# Patient Record
Sex: MALE | Race: WHITE
[De-identification: names, ages, dates, MRNs, and addresses within clinical notes are randomized per-mention and may not be internally consistent; named-entity substitution may affect disease eponyms.]

---
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
## 2021-06-21 ENCOUNTER — HOSPITAL ENCOUNTER (EMERGENCY)
Dept: HOSPITAL 47 - EC | Age: 21
Discharge: HOME | End: 2021-06-21
Payer: COMMERCIAL

## 2021-06-21 VITALS — DIASTOLIC BLOOD PRESSURE: 81 MMHG | SYSTOLIC BLOOD PRESSURE: 135 MMHG

## 2021-06-21 VITALS — RESPIRATION RATE: 18 BRPM | TEMPERATURE: 98.1 F

## 2021-06-21 VITALS — HEART RATE: 78 BPM

## 2021-06-21 DIAGNOSIS — R10.9: ICD-10-CM

## 2021-06-21 DIAGNOSIS — R19.7: ICD-10-CM

## 2021-06-21 DIAGNOSIS — F17.200: ICD-10-CM

## 2021-06-21 DIAGNOSIS — E86.0: Primary | ICD-10-CM

## 2021-06-21 DIAGNOSIS — R11.2: ICD-10-CM

## 2021-06-21 DIAGNOSIS — Z91.011: ICD-10-CM

## 2021-06-21 LAB
ALBUMIN SERPL-MCNC: 5.2 G/DL (ref 3.5–5)
ALP SERPL-CCNC: 110 U/L (ref 38–126)
ALT SERPL-CCNC: 20 U/L (ref 4–49)
AMYLASE SERPL-CCNC: 37 U/L (ref 30–110)
ANION GAP SERPL CALC-SCNC: 16 MMOL/L
AST SERPL-CCNC: 25 U/L (ref 17–59)
BASOPHILS # BLD AUTO: 0 K/UL (ref 0–0.2)
BASOPHILS NFR BLD AUTO: 0 %
BUN SERPL-SCNC: 21 MG/DL (ref 9–20)
CALCIUM SPEC-MCNC: 10.1 MG/DL (ref 8.4–10.2)
CHLORIDE SERPL-SCNC: 104 MMOL/L (ref 98–107)
CO2 SERPL-SCNC: 20 MMOL/L (ref 22–30)
EOSINOPHIL # BLD AUTO: 0 K/UL (ref 0–0.7)
EOSINOPHIL NFR BLD AUTO: 0 %
ERYTHROCYTE [DISTWIDTH] IN BLOOD BY AUTOMATED COUNT: 5.84 M/UL (ref 4.3–5.9)
ERYTHROCYTE [DISTWIDTH] IN BLOOD: 12.5 % (ref 11.5–15.5)
GLUCOSE SERPL-MCNC: 115 MG/DL (ref 74–99)
HCT VFR BLD AUTO: 48.5 % (ref 39–53)
HGB BLD-MCNC: 17.1 GM/DL (ref 13–17.5)
KETONES UR QL STRIP.AUTO: (no result)
LIPASE SERPL-CCNC: 50 U/L (ref 23–300)
LYMPHOCYTES # SPEC AUTO: 0.6 K/UL (ref 1–4.8)
LYMPHOCYTES NFR SPEC AUTO: 5 %
MCH RBC QN AUTO: 29.3 PG (ref 25–35)
MCHC RBC AUTO-ENTMCNC: 35.3 G/DL (ref 31–37)
MCV RBC AUTO: 83 FL (ref 80–100)
MONOCYTES # BLD AUTO: 0.3 K/UL (ref 0–1)
MONOCYTES NFR BLD AUTO: 3 %
NEUTROPHILS # BLD AUTO: 9.7 K/UL (ref 1.3–7.7)
NEUTROPHILS NFR BLD AUTO: 91 %
PH UR: 7 [PH] (ref 5–8)
PLATELET # BLD AUTO: 286 K/UL (ref 150–450)
POTASSIUM SERPL-SCNC: 4 MMOL/L (ref 3.5–5.1)
PROT SERPL-MCNC: 7.9 G/DL (ref 6.3–8.2)
PROT UR QL: (no result)
RBC UR QL: <1 /HPF (ref 0–5)
SODIUM SERPL-SCNC: 140 MMOL/L (ref 137–145)
SP GR UR: 1.04 (ref 1–1.03)
UROBILINOGEN UR QL STRIP: 3 MG/DL (ref ?–2)
WBC # BLD AUTO: 10.7 K/UL (ref 4–11)
WBC # UR AUTO: 1 /HPF (ref 0–5)

## 2021-06-21 PROCEDURE — 82150 ASSAY OF AMYLASE: CPT

## 2021-06-21 PROCEDURE — 96361 HYDRATE IV INFUSION ADD-ON: CPT

## 2021-06-21 PROCEDURE — 36415 COLL VENOUS BLD VENIPUNCTURE: CPT

## 2021-06-21 PROCEDURE — 74018 RADEX ABDOMEN 1 VIEW: CPT

## 2021-06-21 PROCEDURE — 99284 EMERGENCY DEPT VISIT MOD MDM: CPT

## 2021-06-21 PROCEDURE — 96374 THER/PROPH/DIAG INJ IV PUSH: CPT

## 2021-06-21 PROCEDURE — 96376 TX/PRO/DX INJ SAME DRUG ADON: CPT

## 2021-06-21 PROCEDURE — 85025 COMPLETE CBC W/AUTO DIFF WBC: CPT

## 2021-06-21 PROCEDURE — 80053 COMPREHEN METABOLIC PANEL: CPT

## 2021-06-21 PROCEDURE — 81001 URINALYSIS AUTO W/SCOPE: CPT

## 2021-06-21 PROCEDURE — 96375 TX/PRO/DX INJ NEW DRUG ADDON: CPT

## 2021-06-21 PROCEDURE — 83690 ASSAY OF LIPASE: CPT

## 2021-06-21 NOTE — ED
Nausea/Vomiting/Diarrhea HPI





- General


Chief complaint: Nausea/Vomiting/Diarrhea


Stated complaint: Vomiting,Dizziness


Source: patient, RN notes reviewed


Mode of arrival: ambulatory


Limitations: no limitations





- History of Present Illness


Initial comments: 





20-year-old white male presents to the emergency room with 3 days of nausea 

vomiting and diarrhea.  Patient states that on Friday he had 6+ episodes of 

vomiting with 2 loose stools, Sunday he had only one episode of vomiting, today 

he had multiple episodes of vomiting again all bilious in color.  She patient 

denies any fevers.  Patient denies daily alcohol on a daily basis or any drug 

use.  Patient states he used to smoke Marijuana daily but he certainly quit.  

Patient is afebrile at 98.1 no medical history.  No surgical history.  Patient 

did admit that he had edibles and drink alcohol this weekend and then became si

ck.  Patient did try eating applesauce this morning and became sick.  


MD complaint: nausea, vomiting, diarrhea


-: days(s) (3)


Description of Vomiting: bilious


Description of Diarrhea: water


Associated Abdominal Pain: Yes


Location: diffuse


Radiation: none


Severity scale (1-10): 6


Quality: constant


Consistency: constant


Improves with: none


Worsens with: eating, vomiting


Associated Symptoms: nausea/vomiting





- Related Data


                                  Previous Rx's











 Medication  Instructions  Recorded


 


Acetaminophen Tab [Tylenol] 650 mg PO Q6H #30 tab 10/09/20


 


Docusate [Colace] 100 mg PO BID #20 capsule 10/09/20


 


Ibuprofen [Motrin] 600 mg PO Q6HR PRN #40 tab 10/09/20


 


oxyCODONE HCL [OxyIR] 5 mg PO Q4H PRN 3 Days #18 tab 10/09/20


 


Ondansetron Odt [Zofran Odt] 4 mg PO Q8HR PRN #10 tab 06/21/21











                                    Allergies











Allergy/AdvReac Type Severity Reaction Status Date / Time


 


lactose AdvReac  Nausea Verified 06/21/21 14:51














Review of Systems


ROS Statement: 


Those systems with pertinent positive or pertinent negative responses have been 

documented in the HPI.





ROS Other: All systems not noted in ROS Statement are negative.





Past Medical History


Past Medical History: No Reported History


Additional Past Medical History / Comment(s): pilonidal cyst


History of Any Multi-Drug Resistant Organisms: None Reported


Past Surgical History: No Surgical Hx Reported


Additional Past Surgical History / Comment(s): EGD


Past Anesthesia/Blood Transfusion Reactions: No Reported Reaction


Past Psychological History: Anxiety


Smoking Status: Vaper


Past Alcohol Use History: Occasional


Past Drug Use History: Marijuana





- Past Family History


  ** Mother


Family Medical History: No Reported History





  ** Father


Family Medical History: No Reported History





  ** Brother(s)


Family Medical History: Asthma





General Exam


Limitations: no limitations


General appearance: alert, in no apparent distress


Head exam: Present: atraumatic, normocephalic, normal inspection


Eye exam: Present: normal appearance, PERRL, EOMI.  Absent: scleral icterus, 

conjunctival injection, periorbital swelling


ENT exam: Present: normal exam, normal oropharynx, mucous membranes moist


Neck exam: Present: normal inspection, full ROM.  Absent: tenderness, 

meningismus, lymphadenopathy, thyromegaly


Respiratory exam: Present: normal lung sounds bilaterally.  Absent: respiratory 

distress, wheezes, rales, rhonchi, stridor, chest wall tenderness, accessory 

muscle use, decreased breath sounds


Cardiovascular Exam: Present: regular rate, normal rhythm, normal heart sounds. 

Absent: systolic murmur, diastolic murmur, rubs, gallop, clicks


GI/Abdominal exam: Present: soft, normal bowel sounds.  Absent: distended, 

tenderness, guarding, rebound, rigid, organomegaly, mass, hernia


Extremities exam: Present: normal inspection, full ROM, normal capillary refill.

 Absent: tenderness, pedal edema, joint swelling, calf tenderness


Back exam: Present: normal inspection, full ROM.  Absent: tenderness, CVA 

tenderness (R), CVA tenderness (L), muscle spasm, paraspinal tenderness, 

vertebral tenderness, rash noted


Neurological exam: Present: alert, oriented X3, CN II-XII intact


Psychiatric exam: Present: normal affect, normal mood


Skin exam: Present: warm, dry, intact, normal color.  Absent: rash, cyanosis, 

diaphoretic, erythema, petechiae, pallor, mottled





Course


                                   Vital Signs











  06/21/21 06/21/21





  14:48 18:46


 


Temperature 98.1 F 


 


Pulse Rate 60 62


 


Respiratory 18 18





Rate  


 


Blood Pressure 147/87 135/81


 


O2 Sat by Pulse 98 100





Oximetry  














- Reevaluation(s)


Reevaluation #1: 





06/21/21 18:23


Patient states he does feel a little bit better after the medic and IV fluids.  

Patient has not had any further vomiting.  He does admit to smoking marijuana 

daily but recently stopping edible this weekend while drinking.


Time: 18:23





Medical Decision Making





- Medical Decision Making





KUB x-ray shows no obstruction no pneumoperitoneum and no pathological 

calcifications over the kidneys.  She denies any hematochezia or hematemesis.  

Abdomen is soft. Patient's vomiting has subsided after a dose of IV Zofran and 1

L bolus.  Hemoglobin and hematocrit are within normal limits.  UA shows 4+ 

ketones patient was given 2 L of IV fluids.  Patient able to discharge 

requesting a couple of doses of Zofran to be taken at home as needed.  Case 

discussed with Dr. Bravo who was agreeable to this plan of care





- Lab Data


Result diagrams: 


                                 06/21/21 16:50





                                 06/21/21 16:50


                                   Lab Results











  06/21/21 06/21/21 06/21/21 Range/Units





  16:50 16:50 17:49 


 


WBC  10.7    (4.0-11.0)  k/uL


 


RBC  5.84    (4.30-5.90)  m/uL


 


Hgb  17.1    (13.0-17.5)  gm/dL


 


Hct  48.5    (39.0-53.0)  %


 


MCV  83.0    (80.0-100.0)  fL


 


MCH  29.3    (25.0-35.0)  pg


 


MCHC  35.3    (31.0-37.0)  g/dL


 


RDW  12.5    (11.5-15.5)  %


 


Plt Count  286    (150-450)  k/uL


 


MPV  8.0    


 


Neutrophils %  91    %


 


Lymphocytes %  5    %


 


Monocytes %  3    %


 


Eosinophils %  0    %


 


Basophils %  0    %


 


Neutrophils #  9.7 H    (1.3-7.7)  k/uL


 


Lymphocytes #  0.6 L    (1.0-4.8)  k/uL


 


Monocytes #  0.3    (0-1.0)  k/uL


 


Eosinophils #  0.0    (0-0.7)  k/uL


 


Basophils #  0.0    (0-0.2)  k/uL


 


Sodium   140   (137-145)  mmol/L


 


Potassium   4.0   (3.5-5.1)  mmol/L


 


Chloride   104   ()  mmol/L


 


Carbon Dioxide   20 L   (22-30)  mmol/L


 


Anion Gap   16   mmol/L


 


BUN   21 H   (9-20)  mg/dL


 


Creatinine   0.99   (0.66-1.25)  mg/dL


 


Est GFR (CKD-EPI)AfAm   >90   (>60 ml/min/1.73 sqM)  


 


Est GFR (CKD-EPI)NonAf   >90   (>60 ml/min/1.73 sqM)  


 


Glucose   115 H   (74-99)  mg/dL


 


Calcium   10.1   (8.4-10.2)  mg/dL


 


Total Bilirubin   1.3   (0.2-1.3)  mg/dL


 


AST   25   (17-59)  U/L


 


ALT   20   (4-49)  U/L


 


Alkaline Phosphatase   110   ()  U/L


 


Total Protein   7.9   (6.3-8.2)  g/dL


 


Albumin   5.2 H   (3.5-5.0)  g/dL


 


Amylase   37   ()  U/L


 


Lipase   50   ()  U/L


 


Urine Color    Yellow  


 


Urine Appearance    Clear  (Clear)  


 


Urine pH    7.0  (5.0-8.0)  


 


Ur Specific Gravity    1.039 H  (1.001-1.035)  


 


Urine Protein    1+ H  (Negative)  


 


Urine Glucose (UA)    Negative  (Negative)  


 


Urine Ketones    4+ H  (Negative)  


 


Urine Blood    Negative  (Negative)  


 


Urine Nitrite    Negative  (Negative)  


 


Urine Bilirubin    Negative  (Negative)  


 


Urine Urobilinogen    3.0  (<2.0)  mg/dL


 


Ur Leukocyte Esterase    Negative  (Negative)  


 


Urine RBC    <1  (0-5)  /hpf


 


Urine WBC    1  (0-5)  /hpf


 


Urine Mucus    Rare H  (None)  /hpf














Disposition


Clinical Impression: 


 Dehydration, Nausea vomiting and diarrhea





Disposition: HOME SELF-CARE


Condition: Good


Instructions (If sedation given, give patient instructions):  Dehydration (ED), 

Acute Nausea and Vomiting (ED), Acute Diarrhea (ED)


Additional Instructions: 


Increase her fluid intake, follow up with the primary care doctor in 1 week.  

Return if worsening symptoms, fever, inability keep fluids down.


Prescriptions: 


Ondansetron Odt [Zofran Odt] 4 mg PO Q8HR PRN #10 tab


 PRN Reason: Nausea


Is patient prescribed a controlled substance at d/c from ED?: No


Referrals: 


Berta Tipton MD [Primary Care Provider] - 1-2 days


Time of Disposition: 18:28

## 2021-06-21 NOTE — XR
EXAMINATION TYPE: XR KUB

 

DATE OF EXAM: 6/21/2021

 

COMPARISON: 4/30/2020

 

HISTORY: Abdominal pain

 

TECHNIQUE: 2 views upright

 

FINDINGS: There is no sign of intestinal obstruction or pneumoperitoneum. Fecal pattern is normal. Th
ere is no evidence of a mass. There are no pathologic calcifications over the kidneys. Lung bases are
 clear.

 

IMPRESSION: Nonacute abdomen. No change.

## 2021-06-23 ENCOUNTER — HOSPITAL ENCOUNTER (EMERGENCY)
Dept: HOSPITAL 47 - EC | Age: 21
Discharge: HOME | End: 2021-06-23
Payer: COMMERCIAL

## 2021-06-23 VITALS — TEMPERATURE: 97.9 F

## 2021-06-23 VITALS — RESPIRATION RATE: 18 BRPM | HEART RATE: 77 BPM | SYSTOLIC BLOOD PRESSURE: 156 MMHG | DIASTOLIC BLOOD PRESSURE: 77 MMHG

## 2021-06-23 DIAGNOSIS — K52.9: Primary | ICD-10-CM

## 2021-06-23 DIAGNOSIS — Z91.011: ICD-10-CM

## 2021-06-23 DIAGNOSIS — E86.0: ICD-10-CM

## 2021-06-23 DIAGNOSIS — F17.290: ICD-10-CM

## 2021-06-23 DIAGNOSIS — R12: ICD-10-CM

## 2021-06-23 LAB
ALBUMIN SERPL-MCNC: 5.6 G/DL (ref 3.5–5)
ALP SERPL-CCNC: 112 U/L (ref 38–126)
ALT SERPL-CCNC: 19 U/L (ref 4–49)
ANION GAP SERPL CALC-SCNC: 18 MMOL/L
AST SERPL-CCNC: 24 U/L (ref 17–59)
BASOPHILS # BLD AUTO: 0 K/UL (ref 0–0.2)
BASOPHILS NFR BLD AUTO: 0 %
BILIRUB UR QL STRIP.AUTO: (no result)
BUN SERPL-SCNC: 20 MG/DL (ref 9–20)
CALCIUM SPEC-MCNC: 10.5 MG/DL (ref 8.4–10.2)
CHLORIDE SERPL-SCNC: 101 MMOL/L (ref 98–107)
CO2 SERPL-SCNC: 20 MMOL/L (ref 22–30)
EOSINOPHIL # BLD AUTO: 0.1 K/UL (ref 0–0.7)
EOSINOPHIL NFR BLD AUTO: 1 %
ERYTHROCYTE [DISTWIDTH] IN BLOOD BY AUTOMATED COUNT: 5.86 M/UL (ref 4.3–5.9)
ERYTHROCYTE [DISTWIDTH] IN BLOOD: 12.4 % (ref 11.5–15.5)
GLUCOSE SERPL-MCNC: 84 MG/DL (ref 74–99)
HCT VFR BLD AUTO: 48.4 % (ref 39–53)
HGB BLD-MCNC: 17.1 GM/DL (ref 13–17.5)
KETONES UR QL STRIP.AUTO: (no result)
LIPASE SERPL-CCNC: 50 U/L (ref 23–300)
LYMPHOCYTES # SPEC AUTO: 1.5 K/UL (ref 1–4.8)
LYMPHOCYTES NFR SPEC AUTO: 14 %
MCH RBC QN AUTO: 29.1 PG (ref 25–35)
MCHC RBC AUTO-ENTMCNC: 35.3 G/DL (ref 31–37)
MCV RBC AUTO: 82.6 FL (ref 80–100)
MONOCYTES # BLD AUTO: 0.6 K/UL (ref 0–1)
MONOCYTES NFR BLD AUTO: 6 %
NEUTROPHILS # BLD AUTO: 8.3 K/UL (ref 1.3–7.7)
NEUTROPHILS NFR BLD AUTO: 78 %
PH UR: 6 [PH] (ref 5–8)
PLATELET # BLD AUTO: 293 K/UL (ref 150–450)
POTASSIUM SERPL-SCNC: 3.7 MMOL/L (ref 3.5–5.1)
PROT SERPL-MCNC: 8.5 G/DL (ref 6.3–8.2)
PROT UR QL: (no result)
RBC UR QL: <1 /HPF (ref 0–5)
SODIUM SERPL-SCNC: 139 MMOL/L (ref 137–145)
SP GR UR: 1.04 (ref 1–1.03)
SQUAMOUS UR QL AUTO: <1 /HPF (ref 0–4)
UROBILINOGEN UR QL STRIP: 2 MG/DL (ref ?–2)
WBC # BLD AUTO: 10.7 K/UL (ref 4–11)
WBC # UR AUTO: 1 /HPF (ref 0–5)

## 2021-06-23 PROCEDURE — 85025 COMPLETE CBC W/AUTO DIFF WBC: CPT

## 2021-06-23 PROCEDURE — 36415 COLL VENOUS BLD VENIPUNCTURE: CPT

## 2021-06-23 PROCEDURE — 96375 TX/PRO/DX INJ NEW DRUG ADDON: CPT

## 2021-06-23 PROCEDURE — 96374 THER/PROPH/DIAG INJ IV PUSH: CPT

## 2021-06-23 PROCEDURE — 96361 HYDRATE IV INFUSION ADD-ON: CPT

## 2021-06-23 PROCEDURE — 81001 URINALYSIS AUTO W/SCOPE: CPT

## 2021-06-23 PROCEDURE — 99284 EMERGENCY DEPT VISIT MOD MDM: CPT

## 2021-06-23 PROCEDURE — 74177 CT ABD & PELVIS W/CONTRAST: CPT

## 2021-06-23 PROCEDURE — 80053 COMPREHEN METABOLIC PANEL: CPT

## 2021-06-23 PROCEDURE — 83690 ASSAY OF LIPASE: CPT

## 2021-06-23 PROCEDURE — 83605 ASSAY OF LACTIC ACID: CPT

## 2021-06-23 NOTE — ED
Abdominal Pain HPI





- General


Chief Complaint: Abdominal Pain


Stated Complaint: revisit - vomiting


Time Seen by Provider: 06/23/21 12:08


Source: patient, RN notes reviewed


Mode of arrival: ambulatory


Limitations: no limitations





- History of Present Illness


Initial Comments: 





This a 20-year-old male sent emergency from chief complaint nausea vomiting.  

Patient states been sick since Saturday.  He states that he was seen here a few 

days ago was given some medication but states that has not helped.  he states 

this was Zofran at home.  He did have a few episodes of diarrhea lines of mild 

heartburn.  Patient denies any prior abdominal surgeries.  He states he has some

issues like this in the past in which she did have EGD done.  He states he was 

told it was just because he is marijuana at that time.  Patient denies any 

dysuria hematuria no sick contacts.





- Related Data


                                  Previous Rx's











 Medication  Instructions  Recorded


 


Ondansetron Odt [Zofran Odt] 4 mg PO Q8HR PRN #10 tab 06/21/21


 


Metoclopramide [Reglan] 10 mg PO TID PRN #15 tab 06/23/21


 


Omeprazole [PriLOSEC] 40 mg PO DAILY #14 cap 06/23/21











                                    Allergies











Allergy/AdvReac Type Severity Reaction Status Date / Time


 


lactose AdvReac  Nausea Verified 06/23/21 13:14














Review of Systems


ROS Statement: 


Those systems with pertinent positive or pertinent negative responses have been 

documented in the HPI.





ROS Other: All systems not noted in ROS Statement are negative.





Past Medical History


Past Medical History: No Reported History


Additional Past Medical History / Comment(s): pilonidal cyst


History of Any Multi-Drug Resistant Organisms: None Reported


Past Surgical History: No Surgical Hx Reported


Additional Past Surgical History / Comment(s): EGD


Past Anesthesia/Blood Transfusion Reactions: No Reported Reaction


Past Psychological History: Anxiety


Smoking Status: Vaper


Past Alcohol Use History: Occasional


Past Drug Use History: Marijuana





- Past Family History


  ** Mother


Family Medical History: No Reported History





  ** Father


Family Medical History: No Reported History





  ** Brother(s)


Family Medical History: Asthma





General Exam


Limitations: no limitations


General appearance: alert, in no apparent distress


Head exam: Present: atraumatic, normocephalic, normal inspection


Eye exam: Present: normal appearance, PERRL, EOMI.  Absent: scleral icterus, 

conjunctival injection, periorbital swelling


ENT exam: Present: normal exam, normal oropharynx, mucous membranes moist


Neck exam: Present: normal inspection, full ROM.  Absent: tenderness, 

meningismus, lymphadenopathy


Respiratory exam: Present: normal lung sounds bilaterally.  Absent: respiratory 

distress, wheezes, rales, rhonchi, stridor


Cardiovascular Exam: Present: regular rate, normal rhythm, normal heart sounds. 

 Absent: systolic murmur, diastolic murmur, rubs, gallop, clicks


GI/Abdominal exam: Present: soft, tenderness, normal bowel sounds.  Absent: 

distended, guarding, rebound, rigid


Neurological exam: Present: alert


Skin exam: Present: warm, dry, intact, normal color.  Absent: rash





Course


                                   Vital Signs











  06/23/21





  11:17


 


Temperature 97.9 F


 


Pulse Rate 69


 


Respiratory 20





Rate 


 


Blood Pressure 150/85


 


O2 Sat by Pulse 100





Oximetry 














Medical Decision Making





- Medical Decision Making


And she'll presented for nausea vomiting patient had prior urinary visit which 

are reviewed showed sinus dehydration.  Patient was well-hydrated today, given 

antiemetics symptoms immediately resolved.  Patient CT shows evidence of 

enteritis.  Patient will be discharged in stable condition with follow-up with 

GI.








- Lab Data


Result diagrams: 


                                 06/23/21 12:49





                                 06/23/21 12:49


                                   Lab Results











  06/23/21 06/23/21 06/23/21 Range/Units





  12:49 12:49 12:49 


 


WBC  10.7    (4.0-11.0)  k/uL


 


RBC  5.86    (4.30-5.90)  m/uL


 


Hgb  17.1    (13.0-17.5)  gm/dL


 


Hct  48.4    (39.0-53.0)  %


 


MCV  82.6    (80.0-100.0)  fL


 


MCH  29.1    (25.0-35.0)  pg


 


MCHC  35.3    (31.0-37.0)  g/dL


 


RDW  12.4    (11.5-15.5)  %


 


Plt Count  293    (150-450)  k/uL


 


MPV  7.9    


 


Neutrophils %  78    %


 


Lymphocytes %  14    %


 


Monocytes %  6    %


 


Eosinophils %  1    %


 


Basophils %  0    %


 


Neutrophils #  8.3 H    (1.3-7.7)  k/uL


 


Lymphocytes #  1.5    (1.0-4.8)  k/uL


 


Monocytes #  0.6    (0-1.0)  k/uL


 


Eosinophils #  0.1    (0-0.7)  k/uL


 


Basophils #  0.0    (0-0.2)  k/uL


 


Sodium   139   (137-145)  mmol/L


 


Potassium   3.7   (3.5-5.1)  mmol/L


 


Chloride   101   ()  mmol/L


 


Carbon Dioxide   20 L   (22-30)  mmol/L


 


Anion Gap   18   mmol/L


 


BUN   20   (9-20)  mg/dL


 


Creatinine   1.05   (0.66-1.25)  mg/dL


 


Est GFR (CKD-EPI)AfAm   >90   (>60 ml/min/1.73 sqM)  


 


Est GFR (CKD-EPI)NonAf   >90   (>60 ml/min/1.73 sqM)  


 


Glucose   84   (74-99)  mg/dL


 


Plasma Lactic Acid Bladimir    1.3  (0.7-2.0)  mmol/L


 


Calcium   10.5 H   (8.4-10.2)  mg/dL


 


Total Bilirubin   1.4 H   (0.2-1.3)  mg/dL


 


AST   24   (17-59)  U/L


 


ALT   19   (4-49)  U/L


 


Alkaline Phosphatase   112   ()  U/L


 


Total Protein   8.5 H   (6.3-8.2)  g/dL


 


Albumin   5.6 H   (3.5-5.0)  g/dL


 


Lipase   50   ()  U/L














Disposition


Clinical Impression: 


 Enteritis, Dehydration





Disposition: HOME SELF-CARE


Condition: Stable


Instructions (If sedation given, give patient instructions):  Enteritis (ED)


Additional Instructions: 


Please return to the Emergency Department if symptoms worsen or any other 

concerns.


Prescriptions: 


Omeprazole [PriLOSEC] 40 mg PO DAILY #14 cap


Metoclopramide [Reglan] 10 mg PO TID PRN #15 tab


 PRN Reason: Nausea


Is patient prescribed a controlled substance at d/c from ED?: No


Referrals: 


Berta Tipton MD [Primary Care Provider] - 1-2 days


Time of Disposition: 13:57

## 2021-06-23 NOTE — CT
EXAMINATION TYPE: CT abdomen pelvis w con

 

DATE OF EXAM: 6/23/2021

 

COMPARISON: [facility] 6/21/2021

 

HISTORY: Abd pain

 

CT DLP: 1035.7 mGycm

Automated exposure control for dose reduction was used.

 

TECHNIQUE:  Helical acquisition of images from the lung bases through the pelvis have been completed.


 

CONTRAST: 

Performed without Oral Contrast and with IV Contrast, patient injected with 100 mL of Isovue 300.

 

FINDINGS: 

 

LUNG BASES: No significant abnormality is appreciated.

 

AORTA:  No significant abnormality is appreciated.

 

LIVER/GB: The liver shows low attenuation consistent with hepatic steatosis, gallbladder is unremarka
ble.

 

PANCREAS: No significant abnormality is seen.

 

SPLEEN: Enlarged at 13.6 cm in AP dimension

 

ADRENALS: No significant abnormality is seen.

 

KIDNEYS: No significant abnormality is seen.

 

 

REPRODUCTIVE ORGANS: No significant abnormality is seen

 

BOWEL:  There are fluid-filled loops of small bowel, no evident bowel obstruction. Appendix is normal
. Some proximal small bowel loops show thickened wall

 

FREE AIR:  No Free Air visible.

 

ASCITES:  None visible.

 

PELVIC ADENOPATHY:  None visualized.

 

RETROPERITONEAL ADENOPATHY:  No Retroperitoneal Adenopathy visible.

 

URINARY BLADDER:  No significant abnormality is seen.

 

OSSEOUS STRUCTURES:  There is some degenerative disc changes noted the lumbosacral junction with disc
 height loss, posterior disc bulge.

 

IMPRESSION: 

SPLENOMEGALY AND HEPATIC STEATOSIS correlate for enteritis.

## 2021-06-25 ENCOUNTER — HOSPITAL ENCOUNTER (EMERGENCY)
Dept: HOSPITAL 47 - EC | Age: 21
Discharge: HOME | End: 2021-06-25
Payer: COMMERCIAL

## 2021-06-25 VITALS — HEART RATE: 78 BPM | DIASTOLIC BLOOD PRESSURE: 90 MMHG | SYSTOLIC BLOOD PRESSURE: 147 MMHG

## 2021-06-25 VITALS — TEMPERATURE: 98.6 F | RESPIRATION RATE: 18 BRPM

## 2021-06-25 DIAGNOSIS — R19.7: ICD-10-CM

## 2021-06-25 DIAGNOSIS — R11.2: ICD-10-CM

## 2021-06-25 DIAGNOSIS — Z91.011: ICD-10-CM

## 2021-06-25 DIAGNOSIS — E86.0: Primary | ICD-10-CM

## 2021-06-25 DIAGNOSIS — Z88.8: ICD-10-CM

## 2021-06-25 DIAGNOSIS — F17.290: ICD-10-CM

## 2021-06-25 LAB
ALBUMIN SERPL-MCNC: 5.2 G/DL (ref 3.5–5)
ALP SERPL-CCNC: 108 U/L (ref 38–126)
ALT SERPL-CCNC: 18 U/L (ref 4–49)
AMYLASE SERPL-CCNC: 33 U/L (ref 30–110)
ANION GAP SERPL CALC-SCNC: 17 MMOL/L
AST SERPL-CCNC: 22 U/L (ref 17–59)
BASOPHILS # BLD AUTO: 0 K/UL (ref 0–0.2)
BASOPHILS NFR BLD AUTO: 0 %
BUN SERPL-SCNC: 21 MG/DL (ref 9–20)
CALCIUM SPEC-MCNC: 10.3 MG/DL (ref 8.4–10.2)
CHLORIDE SERPL-SCNC: 102 MMOL/L (ref 98–107)
CO2 SERPL-SCNC: 18 MMOL/L (ref 22–30)
EOSINOPHIL # BLD AUTO: 0 K/UL (ref 0–0.7)
EOSINOPHIL NFR BLD AUTO: 0 %
ERYTHROCYTE [DISTWIDTH] IN BLOOD BY AUTOMATED COUNT: 6.04 M/UL (ref 4.3–5.9)
ERYTHROCYTE [DISTWIDTH] IN BLOOD: 13 % (ref 11.5–15.5)
GLUCOSE SERPL-MCNC: 84 MG/DL (ref 74–99)
HCT VFR BLD AUTO: 50.1 % (ref 39–53)
HGB BLD-MCNC: 17.4 GM/DL (ref 13–17.5)
KETONES UR QL STRIP.AUTO: (no result)
LIPASE SERPL-CCNC: 74 U/L (ref 23–300)
LYMPHOCYTES # SPEC AUTO: 1.2 K/UL (ref 1–4.8)
LYMPHOCYTES NFR SPEC AUTO: 13 %
MCH RBC QN AUTO: 28.8 PG (ref 25–35)
MCHC RBC AUTO-ENTMCNC: 34.7 G/DL (ref 31–37)
MCV RBC AUTO: 83 FL (ref 80–100)
MONOCYTES # BLD AUTO: 0.5 K/UL (ref 0–1)
MONOCYTES NFR BLD AUTO: 6 %
NEUTROPHILS # BLD AUTO: 7.1 K/UL (ref 1.3–7.7)
NEUTROPHILS NFR BLD AUTO: 79 %
PH UR: 6 [PH] (ref 5–8)
PLATELET # BLD AUTO: 265 K/UL (ref 150–450)
POTASSIUM SERPL-SCNC: 3.6 MMOL/L (ref 3.5–5.1)
PROT SERPL-MCNC: 7.7 G/DL (ref 6.3–8.2)
SODIUM SERPL-SCNC: 137 MMOL/L (ref 137–145)
SP GR UR: 1.04 (ref 1–1.03)
UROBILINOGEN UR QL STRIP: 2 MG/DL (ref ?–2)
WBC # BLD AUTO: 9.1 K/UL (ref 4–11)

## 2021-06-25 PROCEDURE — 96361 HYDRATE IV INFUSION ADD-ON: CPT

## 2021-06-25 PROCEDURE — 86664 EPSTEIN-BARR NUCLEAR ANTIGEN: CPT

## 2021-06-25 PROCEDURE — 96375 TX/PRO/DX INJ NEW DRUG ADDON: CPT

## 2021-06-25 PROCEDURE — 83690 ASSAY OF LIPASE: CPT

## 2021-06-25 PROCEDURE — 81003 URINALYSIS AUTO W/O SCOPE: CPT

## 2021-06-25 PROCEDURE — 86663 EPSTEIN-BARR ANTIBODY: CPT

## 2021-06-25 PROCEDURE — 36415 COLL VENOUS BLD VENIPUNCTURE: CPT

## 2021-06-25 PROCEDURE — 86308 HETEROPHILE ANTIBODY SCREEN: CPT

## 2021-06-25 PROCEDURE — 86665 EPSTEIN-BARR CAPSID VCA: CPT

## 2021-06-25 PROCEDURE — 85025 COMPLETE CBC W/AUTO DIFF WBC: CPT

## 2021-06-25 PROCEDURE — 96374 THER/PROPH/DIAG INJ IV PUSH: CPT

## 2021-06-25 PROCEDURE — 96376 TX/PRO/DX INJ SAME DRUG ADON: CPT

## 2021-06-25 PROCEDURE — 80306 DRUG TEST PRSMV INSTRMNT: CPT

## 2021-06-25 PROCEDURE — 82150 ASSAY OF AMYLASE: CPT

## 2021-06-25 PROCEDURE — 99284 EMERGENCY DEPT VISIT MOD MDM: CPT

## 2021-06-25 PROCEDURE — 80053 COMPREHEN METABOLIC PANEL: CPT

## 2021-06-25 NOTE — ED
General Adult HPI





- General


Chief complaint: Nausea/Vomiting/Diarrhea


Stated complaint: NVD


Time Seen by Provider: 06/25/21 07:28


Source: patient, family, RN notes reviewed


Mode of arrival: ambulatory


Limitations: no limitations





- History of Present Illness


Initial comments: 


20-year-old male presents emergency Department chief complaint of nausea vomitin

g diarrhea.  Symptoms started on Saturday.  He has had 2 prior ER visits with 

signs of dehydration otherwise negative workup.  Patient's had CT which was 

negative.  Patient states that he's been taking her Zofran he states every 6 

hours he feels like he centimeters vomit.  Patient denies any localized pain.  

He did admit that he had issues with this one year ago and hematoma is related 

to marijuana use.  He states that he did use up on Saturday.  Patient denies any

fevers or chills no chest pain or shortness of breath.








- Related Data


                                Home Medications











 Medication  Instructions  Recorded  Confirmed


 


Ondansetron Odt [Zofran Odt] 4 mg PO Q8H PRN 06/25/21 06/25/21








                                  Previous Rx's











 Medication  Instructions  Recorded


 


Omeprazole [PriLOSEC] 40 mg PO DAILY #14 cap 06/23/21


 


Ondansetron Odt [Zofran Odt] 8 mg PO Q8HR #14 tab 06/25/21











                                    Allergies











Allergy/AdvReac Type Severity Reaction Status Date / Time


 


lactose AdvReac  Nausea & Verified 06/25/21 08:51





   Vomiting &  





   Diarrhea  


 


metoclopramide [From Reglan] AdvReac  Nausea & Verified 06/25/21 08:51





   Vomiting  














Review of Systems


ROS Statement: 


Those systems with pertinent positive or pertinent negative responses have been 

documented in the HPI.





ROS Other: All systems not noted in ROS Statement are negative.





Past Medical History


Past Medical History: No Reported History


Additional Past Medical History / Comment(s): pilonidal cyst


History of Any Multi-Drug Resistant Organisms: None Reported


Past Surgical History: No Surgical Hx Reported


Additional Past Surgical History / Comment(s): EGD


Past Anesthesia/Blood Transfusion Reactions: No Reported Reaction


Past Psychological History: Anxiety


Smoking Status: Vaper


Past Alcohol Use History: Occasional


Past Drug Use History: Marijuana





- Past Family History


  ** Mother


Family Medical History: No Reported History





  ** Father


Family Medical History: No Reported History





  ** Brother(s)


Family Medical History: Asthma





General Exam


Limitations: no limitations


General appearance: alert, in no apparent distress


Head exam: Present: atraumatic, normocephalic, normal inspection


Respiratory exam: Present: normal lung sounds bilaterally.  Absent: respiratory 

distress, wheezes, rales, rhonchi, stridor


Cardiovascular Exam: Present: regular rate, normal rhythm, normal heart sounds. 

Absent: systolic murmur, diastolic murmur, rubs, gallop, clicks


GI/Abdominal exam: Present: soft, tenderness (Mild diffuse), normal bowel 

sounds.  Absent: distended, guarding, rebound, rigid


Back exam: Absent: CVA tenderness (R), CVA tenderness (L)


Neurological exam: Present: alert


Skin exam: Present: warm, dry, intact, normal color.  Absent: rash





Course


                                   Vital Signs











  06/25/21 06/25/21 06/25/21





  07:24 08:27 09:00


 


Temperature 98.6 F  


 


Pulse Rate 77  81


 


Respiratory 18 18 18





Rate   


 


Blood Pressure 141/89  127/76


 


O2 Sat by Pulse 100  97





Oximetry   














Medical Decision Making





- Medical Decision Making





20-year-old male presented for nausea vomiting.  Patient's had a workup over 

last few days.  Patient does have signs of dehydration though was given 3 L of 

fluid, given antiemetics he states he feels better is requesting to be 

discharged prior to fluids being completed.  I did recommend patient to be 

completed.  We did discuss marijuana use.  Patient is still positive for THC.  

Did explain that he can use hot showers, Station cream.





- Lab Data


Result diagrams: 


                                 06/25/21 08:05





                                 06/25/21 08:05


                                   Lab Results











  06/25/21 06/25/21 06/25/21 Range/Units





  08:05 08:05 09:09 


 


WBC  9.1    (4.0-11.0)  k/uL


 


RBC  6.04 H    (4.30-5.90)  m/uL


 


Hgb  17.4    (13.0-17.5)  gm/dL


 


Hct  50.1    (39.0-53.0)  %


 


MCV  83.0    (80.0-100.0)  fL


 


MCH  28.8    (25.0-35.0)  pg


 


MCHC  34.7    (31.0-37.0)  g/dL


 


RDW  13.0    (11.5-15.5)  %


 


Plt Count  265    (150-450)  k/uL


 


MPV  8.0    


 


Neutrophils %  79    %


 


Lymphocytes %  13    %


 


Monocytes %  6    %


 


Eosinophils %  0    %


 


Basophils %  0    %


 


Neutrophils #  7.1    (1.3-7.7)  k/uL


 


Lymphocytes #  1.2    (1.0-4.8)  k/uL


 


Monocytes #  0.5    (0-1.0)  k/uL


 


Eosinophils #  0.0    (0-0.7)  k/uL


 


Basophils #  0.0    (0-0.2)  k/uL


 


Sodium   137   (137-145)  mmol/L


 


Potassium   3.6   (3.5-5.1)  mmol/L


 


Chloride   102   ()  mmol/L


 


Carbon Dioxide   18 L   (22-30)  mmol/L


 


Anion Gap   17   mmol/L


 


BUN   21 H   (9-20)  mg/dL


 


Creatinine   1.05   (0.66-1.25)  mg/dL


 


Est GFR (CKD-EPI)AfAm   >90   (>60 ml/min/1.73 sqM)  


 


Est GFR (CKD-EPI)NonAf   >90   (>60 ml/min/1.73 sqM)  


 


Glucose   84   (74-99)  mg/dL


 


Calcium   10.3 H   (8.4-10.2)  mg/dL


 


Total Bilirubin   1.5 H   (0.2-1.3)  mg/dL


 


AST   22   (17-59)  U/L


 


ALT   18   (4-49)  U/L


 


Alkaline Phosphatase   108   ()  U/L


 


Total Protein   7.7   (6.3-8.2)  g/dL


 


Albumin   5.2 H   (3.5-5.0)  g/dL


 


Amylase   33   ()  U/L


 


Lipase   74   ()  U/L


 


Urine Color    Yellow  


 


Urine Appearance    Clear  (Clear)  


 


Urine pH    6.0  (5.0-8.0)  


 


Ur Specific Gravity    1.036 H  (1.001-1.035)  


 


Urine Protein    Trace H  (Negative)  


 


Urine Glucose (UA)    Negative  (Negative)  


 


Urine Ketones    4+ H  (Negative)  


 


Urine Blood    Negative  (Negative)  


 


Urine Nitrite    Negative  (Negative)  


 


Urine Bilirubin    Negative  (Negative)  


 


Urine Urobilinogen    2.0  (<2.0)  mg/dL


 


Ur Leukocyte Esterase    Negative  (Negative)  


 


Urine Opiates Screen     (NotDetected)  


 


Ur Oxycodone Screen     (NotDetected)  


 


Urine Methadone Screen     (NotDetected)  


 


Ur Propoxyphene Screen     (NotDetected)  


 


Ur Barbiturates Screen     (NotDetected)  


 


U Tricyclic Antidepress     (NotDetected)  


 


Ur Phencyclidine Scrn     (NotDetected)  


 


Ur Amphetamines Screen     (NotDetected)  


 


U Methamphetamines Scrn     (NotDetected)  


 


U Benzodiazepines Scrn     (NotDetected)  


 


Urine Cocaine Screen     (NotDetected)  


 


U Marijuana (THC) Screen     (NotDetected)  














  06/25/21 Range/Units





  09:09 


 


WBC   (4.0-11.0)  k/uL


 


RBC   (4.30-5.90)  m/uL


 


Hgb   (13.0-17.5)  gm/dL


 


Hct   (39.0-53.0)  %


 


MCV   (80.0-100.0)  fL


 


MCH   (25.0-35.0)  pg


 


MCHC   (31.0-37.0)  g/dL


 


RDW   (11.5-15.5)  %


 


Plt Count   (150-450)  k/uL


 


MPV   


 


Neutrophils %   %


 


Lymphocytes %   %


 


Monocytes %   %


 


Eosinophils %   %


 


Basophils %   %


 


Neutrophils #   (1.3-7.7)  k/uL


 


Lymphocytes #   (1.0-4.8)  k/uL


 


Monocytes #   (0-1.0)  k/uL


 


Eosinophils #   (0-0.7)  k/uL


 


Basophils #   (0-0.2)  k/uL


 


Sodium   (137-145)  mmol/L


 


Potassium   (3.5-5.1)  mmol/L


 


Chloride   ()  mmol/L


 


Carbon Dioxide   (22-30)  mmol/L


 


Anion Gap   mmol/L


 


BUN   (9-20)  mg/dL


 


Creatinine   (0.66-1.25)  mg/dL


 


Est GFR (CKD-EPI)AfAm   (>60 ml/min/1.73 sqM)  


 


Est GFR (CKD-EPI)NonAf   (>60 ml/min/1.73 sqM)  


 


Glucose   (74-99)  mg/dL


 


Calcium   (8.4-10.2)  mg/dL


 


Total Bilirubin   (0.2-1.3)  mg/dL


 


AST   (17-59)  U/L


 


ALT   (4-49)  U/L


 


Alkaline Phosphatase   ()  U/L


 


Total Protein   (6.3-8.2)  g/dL


 


Albumin   (3.5-5.0)  g/dL


 


Amylase   ()  U/L


 


Lipase   ()  U/L


 


Urine Color   


 


Urine Appearance   (Clear)  


 


Urine pH   (5.0-8.0)  


 


Ur Specific Gravity   (1.001-1.035)  


 


Urine Protein   (Negative)  


 


Urine Glucose (UA)   (Negative)  


 


Urine Ketones   (Negative)  


 


Urine Blood   (Negative)  


 


Urine Nitrite   (Negative)  


 


Urine Bilirubin   (Negative)  


 


Urine Urobilinogen   (<2.0)  mg/dL


 


Ur Leukocyte Esterase   (Negative)  


 


Urine Opiates Screen  Not Detected  (NotDetected)  


 


Ur Oxycodone Screen  Not Detected  (NotDetected)  


 


Urine Methadone Screen  Not Detected  (NotDetected)  


 


Ur Propoxyphene Screen  Not Detected  (NotDetected)  


 


Ur Barbiturates Screen  Not Detected  (NotDetected)  


 


U Tricyclic Antidepress  Not Detected  (NotDetected)  


 


Ur Phencyclidine Scrn  Not Detected  (NotDetected)  


 


Ur Amphetamines Screen  Not Detected  (NotDetected)  


 


U Methamphetamines Scrn  Not Detected  (NotDetected)  


 


U Benzodiazepines Scrn  Not Detected  (NotDetected)  


 


Urine Cocaine Screen  Not Detected  (NotDetected)  


 


U Marijuana (THC) Screen  Detected H  (NotDetected)  














Disposition


Clinical Impression: 


 Dehydration, Nausea vomiting and diarrhea





Disposition: HOME SELF-CARE


Condition: Stable


Instructions (If sedation given, give patient instructions):  Acute Nausea and 

Vomiting (ED)


Additional Instructions: 


Please discontinue marijuana use.  Patient may use capsacian cream, hot 

showers.Please return to the Emergency Department if symptoms worsen or any 

other concerns.


Prescriptions: 


Ondansetron Odt [Zofran Odt] 8 mg PO Q8HR #14 tab


Is patient prescribed a controlled substance at d/c from ED?: No


Referrals: 


Berta Tipton MD [Primary Care Provider] - 1-2 days


Time of Disposition: 10:20

## 2021-06-29 LAB
EBV EA IGG SER-ACNC: 0.5 AI
EBV NA IGG SER-ACNC: >8 AI
EBV VCA IGG SER IA-ACNC: 1.6 AI
EBV VCA IGM SP2 SER QL: 1.2 AI

## 2021-06-30 ENCOUNTER — HOSPITAL ENCOUNTER (EMERGENCY)
Dept: HOSPITAL 47 - EC | Age: 21
Discharge: HOME | End: 2021-06-30
Payer: COMMERCIAL

## 2021-06-30 VITALS
RESPIRATION RATE: 19 BRPM | TEMPERATURE: 98 F | HEART RATE: 72 BPM | DIASTOLIC BLOOD PRESSURE: 81 MMHG | SYSTOLIC BLOOD PRESSURE: 122 MMHG

## 2021-06-30 DIAGNOSIS — E83.52: ICD-10-CM

## 2021-06-30 DIAGNOSIS — K52.9: ICD-10-CM

## 2021-06-30 DIAGNOSIS — Z87.891: ICD-10-CM

## 2021-06-30 DIAGNOSIS — Z91.011: ICD-10-CM

## 2021-06-30 DIAGNOSIS — R82.4: ICD-10-CM

## 2021-06-30 DIAGNOSIS — E86.0: Primary | ICD-10-CM

## 2021-06-30 LAB
ALBUMIN SERPL-MCNC: 5.4 G/DL (ref 3.5–5)
ALP SERPL-CCNC: 113 U/L (ref 38–126)
ALT SERPL-CCNC: 33 U/L (ref 4–49)
AMYLASE SERPL-CCNC: 38 U/L (ref 30–110)
ANION GAP SERPL CALC-SCNC: 16 MMOL/L
AST SERPL-CCNC: 28 U/L (ref 17–59)
BASOPHILS # BLD AUTO: 0 K/UL (ref 0–0.2)
BASOPHILS NFR BLD AUTO: 0 %
BUN SERPL-SCNC: 16 MG/DL (ref 9–20)
CALCIUM SPEC-MCNC: 10.4 MG/DL (ref 8.4–10.2)
CHLORIDE SERPL-SCNC: 95 MMOL/L (ref 98–107)
CO2 SERPL-SCNC: 25 MMOL/L (ref 22–30)
EOSINOPHIL # BLD AUTO: 0 K/UL (ref 0–0.7)
EOSINOPHIL NFR BLD AUTO: 1 %
ERYTHROCYTE [DISTWIDTH] IN BLOOD BY AUTOMATED COUNT: 6.13 M/UL (ref 4.3–5.9)
ERYTHROCYTE [DISTWIDTH] IN BLOOD: 12.4 % (ref 11.5–15.5)
GLUCOSE SERPL-MCNC: 103 MG/DL (ref 74–99)
HCT VFR BLD AUTO: 50 % (ref 39–53)
HGB BLD-MCNC: 18 GM/DL (ref 13–17.5)
KETONES UR QL STRIP.AUTO: (no result)
LIPASE SERPL-CCNC: 145 U/L (ref 23–300)
LYMPHOCYTES # SPEC AUTO: 0.9 K/UL (ref 1–4.8)
LYMPHOCYTES NFR SPEC AUTO: 11 %
MCH RBC QN AUTO: 29.3 PG (ref 25–35)
MCHC RBC AUTO-ENTMCNC: 36 G/DL (ref 31–37)
MCV RBC AUTO: 81.5 FL (ref 80–100)
MONOCYTES # BLD AUTO: 0.5 K/UL (ref 0–1)
MONOCYTES NFR BLD AUTO: 7 %
NEUTROPHILS # BLD AUTO: 6.2 K/UL (ref 1.3–7.7)
NEUTROPHILS NFR BLD AUTO: 79 %
PH UR: 6 [PH] (ref 5–8)
PLATELET # BLD AUTO: 280 K/UL (ref 150–450)
POTASSIUM SERPL-SCNC: 3.7 MMOL/L (ref 3.5–5.1)
PROT SERPL-MCNC: 8.2 G/DL (ref 6.3–8.2)
SODIUM SERPL-SCNC: 136 MMOL/L (ref 137–145)
SP GR UR: >1.05 (ref 1–1.03)
UROBILINOGEN UR QL STRIP: <2 MG/DL (ref ?–2)
WBC # BLD AUTO: 7.8 K/UL (ref 4–11)

## 2021-06-30 PROCEDURE — 81003 URINALYSIS AUTO W/O SCOPE: CPT

## 2021-06-30 PROCEDURE — 85025 COMPLETE CBC W/AUTO DIFF WBC: CPT

## 2021-06-30 PROCEDURE — 80061 LIPID PANEL: CPT

## 2021-06-30 PROCEDURE — 96375 TX/PRO/DX INJ NEW DRUG ADDON: CPT

## 2021-06-30 PROCEDURE — 96374 THER/PROPH/DIAG INJ IV PUSH: CPT

## 2021-06-30 PROCEDURE — 99284 EMERGENCY DEPT VISIT MOD MDM: CPT

## 2021-06-30 PROCEDURE — 87430 STREP A AG IA: CPT

## 2021-06-30 PROCEDURE — 80053 COMPREHEN METABOLIC PANEL: CPT

## 2021-06-30 PROCEDURE — 96360 HYDRATION IV INFUSION INIT: CPT

## 2021-06-30 PROCEDURE — 36415 COLL VENOUS BLD VENIPUNCTURE: CPT

## 2021-06-30 PROCEDURE — 82150 ASSAY OF AMYLASE: CPT

## 2021-06-30 PROCEDURE — 83690 ASSAY OF LIPASE: CPT

## 2021-06-30 PROCEDURE — 96361 HYDRATE IV INFUSION ADD-ON: CPT

## 2021-06-30 PROCEDURE — 87081 CULTURE SCREEN ONLY: CPT

## 2021-06-30 PROCEDURE — 86308 HETEROPHILE ANTIBODY SCREEN: CPT

## 2021-06-30 PROCEDURE — 74177 CT ABD & PELVIS W/CONTRAST: CPT

## 2021-06-30 NOTE — ED
General Adult HPI





- General


Chief complaint: Nausea/Vomiting/Diarrhea


Stated complaint: Mono


Time Seen by Provider: 06/30/21 10:03


Source: patient, RN notes reviewed


Mode of arrival: ambulatory


Limitations: no limitations





- History of Present Illness


Initial comments: 





Patient is a 20-year-old male that presents to emergency department complaining 

of abdominal pain.  His similar notes that the primary called in today stating 

that he had mono in the come emergency room to get evaluated.  Patient did not 

display any of the symptoms of mono and he only complained of abdominal pain.  

He denied any sore throat difficulty swallowing increased fatigue.  Patient was 

a well-appearing 20-year-old male stating that he doesn't like the taste of his 

Zoloft.  Patient was uninterested in the interview and exam continue to play 

games on his phone throughout the entire thing.  He noted that he was 

constipated and hasn't had a bowel movement in 3 days.  He denied any chest pain

shortness of breath headache diarrhea fever fatigue chills.





- Related Data


                                  Previous Rx's











 Medication  Instructions  Recorded


 


Omeprazole [PriLOSEC] 40 mg PO DAILY #14 cap 06/23/21


 


Ondansetron Odt [Zofran Odt] 8 mg PO Q8HR #14 tab 06/25/21











                                    Allergies











Allergy/AdvReac Type Severity Reaction Status Date / Time


 


lactose AdvReac  Nausea & Verified 06/30/21 12:06





   Vomiting &  





   Diarrhea  














Review of Systems


ROS Statement: 


Those systems with pertinent positive or pertinent negative responses have been 

documented in the HPI.





ROS Other: All systems not noted in ROS Statement are negative.





Past Medical History


Past Medical History: No Reported History


Additional Past Medical History / Comment(s): pilonidal cyst


History of Any Multi-Drug Resistant Organisms: None Reported


Past Surgical History: No Surgical Hx Reported


Additional Past Surgical History / Comment(s): EGD


Past Anesthesia/Blood Transfusion Reactions: No Reported Reaction


Past Psychological History: No Psychological Hx Reported


Smoking Status: Former smoker, Vaper


Past Alcohol Use History: None Reported, Occasional


Past Drug Use History: Marijuana





- Past Family History


  ** Mother


Family Medical History: No Reported History





  ** Father


Family Medical History: No Reported History





  ** Brother(s)


Family Medical History: Asthma





General Exam


Limitations: no limitations


General appearance: alert, in no apparent distress


Head exam: Present: atraumatic, normocephalic, normal inspection


Eye exam: Present: normal appearance, PERRL, EOMI.  Absent: scleral icterus, 

conjunctival injection, periorbital swelling


Neck exam: Present: normal inspection.  Absent: tenderness, meningismus, 

lymphadenopathy


Respiratory exam: Present: normal lung sounds bilaterally.  Absent: respiratory 

distress, wheezes, rales, rhonchi, stridor


Cardiovascular Exam: Present: regular rate, normal rhythm, normal heart sounds. 

Absent: systolic murmur, diastolic murmur, rubs, gallop, clicks


GI/Abdominal exam: Present: soft, tenderness (Epigastric region), normal bowel 

sounds.  Absent: distended, guarding, rebound, rigid


Extremities exam: Present: normal inspection, full ROM, normal capillary refill.

 Absent: tenderness, pedal edema, joint swelling, calf tenderness


Neurological exam: Present: alert, oriented X3


Psychiatric exam: Present: normal affect, normal mood


Skin exam: Present: warm, dry, intact, normal color.  Absent: rash





Course


                                   Vital Signs











  06/30/21





  09:57


 


Temperature 98 F


 


Pulse Rate 95


 


Respiratory 18





Rate 


 


Blood Pressure 153/111


 


O2 Sat by Pulse 99





Oximetry 














Medical Decision Making





- Medical Decision Making





20-year-old male complaining of abdominal pain with some constipation for the 

past 3 days sent in by primary care for mono.


Strep test, heterophile test, labs, CT of the abdomen and pelvis, 1 L normal 

saline, 4 mg of Zofran ordered.


Heterophile test negative.


Strep test negative.


Labs show dehydration with concentrated hemoglobin, ketones in urine elevated ca

lcium.


Case discussed with Dr. Birch, patient will discharge home after 1 more 

liter of fluids with follow-up to primary care and GI as needed.





- Lab Data


Result diagrams: 


                                 06/30/21 10:28





                                 06/30/21 10:28


                                   Lab Results











  06/30/21 06/30/21 06/30/21 Range/Units





  10:28 10:28 10:28 


 


WBC  7.8    (4.0-11.0)  k/uL


 


RBC  6.13 H    (4.30-5.90)  m/uL


 


Hgb  18.0 H    (13.0-17.5)  gm/dL


 


Hct  50.0    (39.0-53.0)  %


 


MCV  81.5    (80.0-100.0)  fL


 


MCH  29.3    (25.0-35.0)  pg


 


MCHC  36.0    (31.0-37.0)  g/dL


 


RDW  12.4    (11.5-15.5)  %


 


Plt Count  280    (150-450)  k/uL


 


MPV  7.6    


 


Neutrophils %  79    %


 


Lymphocytes %  11    %


 


Monocytes %  7    %


 


Eosinophils %  1    %


 


Basophils %  0    %


 


Neutrophils #  6.2    (1.3-7.7)  k/uL


 


Lymphocytes #  0.9 L    (1.0-4.8)  k/uL


 


Monocytes #  0.5    (0-1.0)  k/uL


 


Eosinophils #  0.0    (0-0.7)  k/uL


 


Basophils #  0.0    (0-0.2)  k/uL


 


Sodium    136 L  (137-145)  mmol/L


 


Potassium    3.7  (3.5-5.1)  mmol/L


 


Chloride    95 L  ()  mmol/L


 


Carbon Dioxide    25  (22-30)  mmol/L


 


Anion Gap    16  mmol/L


 


BUN    16  (9-20)  mg/dL


 


Creatinine    1.17  (0.66-1.25)  mg/dL


 


Est GFR (CKD-EPI)AfAm    >90  (>60 ml/min/1.73 sqM)  


 


Est GFR (CKD-EPI)NonAf    90  (>60 ml/min/1.73 sqM)  


 


Glucose    103 H  (74-99)  mg/dL


 


Calcium    10.4 H  (8.4-10.2)  mg/dL


 


Total Bilirubin    1.2  (0.2-1.3)  mg/dL


 


AST    28  (17-59)  U/L


 


ALT    33  (4-49)  U/L


 


Alkaline Phosphatase    113  ()  U/L


 


Total Protein    8.2  (6.3-8.2)  g/dL


 


Albumin    5.4 H  (3.5-5.0)  g/dL


 


Amylase    38  ()  U/L


 


Lipase    145  ()  U/L


 


Urine Color     


 


Urine Appearance     (Clear)  


 


Urine pH     (5.0-8.0)  


 


Ur Specific Gravity     (1.001-1.035)  


 


Urine Protein     (Negative)  


 


Urine Glucose (UA)     (Negative)  


 


Urine Ketones     (Negative)  


 


Urine Blood     (Negative)  


 


Urine Nitrite     (Negative)  


 


Urine Bilirubin     (Negative)  


 


Urine Urobilinogen     (<2.0)  mg/dL


 


Ur Leukocyte Esterase     (Negative)  


 


Heterophile Antibody   Negative   (Negative)  


 


Group A Strep Rapid     (Negative)  














  06/30/21 06/30/21 Range/Units





  10:28 11:05 


 


WBC    (4.0-11.0)  k/uL


 


RBC    (4.30-5.90)  m/uL


 


Hgb    (13.0-17.5)  gm/dL


 


Hct    (39.0-53.0)  %


 


MCV    (80.0-100.0)  fL


 


MCH    (25.0-35.0)  pg


 


MCHC    (31.0-37.0)  g/dL


 


RDW    (11.5-15.5)  %


 


Plt Count    (150-450)  k/uL


 


MPV    


 


Neutrophils %    %


 


Lymphocytes %    %


 


Monocytes %    %


 


Eosinophils %    %


 


Basophils %    %


 


Neutrophils #    (1.3-7.7)  k/uL


 


Lymphocytes #    (1.0-4.8)  k/uL


 


Monocytes #    (0-1.0)  k/uL


 


Eosinophils #    (0-0.7)  k/uL


 


Basophils #    (0-0.2)  k/uL


 


Sodium    (137-145)  mmol/L


 


Potassium    (3.5-5.1)  mmol/L


 


Chloride    ()  mmol/L


 


Carbon Dioxide    (22-30)  mmol/L


 


Anion Gap    mmol/L


 


BUN    (9-20)  mg/dL


 


Creatinine    (0.66-1.25)  mg/dL


 


Est GFR (CKD-EPI)AfAm    (>60 ml/min/1.73 sqM)  


 


Est GFR (CKD-EPI)NonAf    (>60 ml/min/1.73 sqM)  


 


Glucose    (74-99)  mg/dL


 


Calcium    (8.4-10.2)  mg/dL


 


Total Bilirubin    (0.2-1.3)  mg/dL


 


AST    (17-59)  U/L


 


ALT    (4-49)  U/L


 


Alkaline Phosphatase    ()  U/L


 


Total Protein    (6.3-8.2)  g/dL


 


Albumin    (3.5-5.0)  g/dL


 


Amylase    ()  U/L


 


Lipase    ()  U/L


 


Urine Color   Yellow  


 


Urine Appearance   Clear  (Clear)  


 


Urine pH   6.0  (5.0-8.0)  


 


Ur Specific Gravity   >1.050 H  (1.001-1.035)  


 


Urine Protein   Trace H  (Negative)  


 


Urine Glucose (UA)   Negative  (Negative)  


 


Urine Ketones   3+ H  (Negative)  


 


Urine Blood   Negative  (Negative)  


 


Urine Nitrite   Negative  (Negative)  


 


Urine Bilirubin   Negative  (Negative)  


 


Urine Urobilinogen   <2.0  (<2.0)  mg/dL


 


Ur Leukocyte Esterase   Negative  (Negative)  


 


Heterophile Antibody    (Negative)  


 


Group A Strep Rapid  Negative   (Negative)  














- Radiology Data


Radiology results: report reviewed, image reviewed





CT of the abdomen and pelvis: Normal CT abdomen and pelvis





Disposition


Clinical Impression: 


 Dehydration, Nausea vomiting and diarrhea, Enteritis





Disposition: HOME SELF-CARE


Condition: Stable


Instructions (If sedation given, give patient instructions):  Acute Nausea and 

Vomiting (ED)


Additional Instructions: 


Please return to the Emergency Department if symptoms worsen or any other 

concerns.


Follow-up with primary care and GI as needed.


Take medication as prescribed.


Increase oral fluid intake.





Is patient prescribed a controlled substance at d/c from ED?: No


Referrals: 


Berta Tipton MD [Primary Care Provider] - 1-2 days


Megha Honeycutt MD [STAFF PHYSICIAN] - 1-2 days


Time of Disposition: 13:59

## 2021-06-30 NOTE — CT
EXAMINATION TYPE: CT abdomen pelvis w con

 

DATE OF EXAM: 6/30/2021

 

COMPARISON: 6/23/2021

 

INDICATION: Epigastric pain

 

DLP: 937.6 mGycm, Automated exposure control for dose reduction was used.

 

CONTRAST:  100 ml mL of Isovue 300. 

                        Study performed without Oral Contrast

 

TECHNIQUE: Axial images were obtained from above the diaphragm to the pubic rami in the axial plane a
t 5 mm thick sections.  Reconstructed images are reviewed on the computer in the coronal plane.  

 

FINDINGS:

 

Limited CT sections are obtained the lung bases.  The lung bases are clear.

 

CT ABDOMEN:

 

Liver: Normal

 

Spleen: Normal

 

Pancreas: Normal

 

Adrenal glands: The adrenal glands are normal.

 

Gallbladder: Normal  

 

Kidneys: No masses are evident. No hydronephrosis is present.   No cysts are present.  Delayed images
 were obtained through the kidneys, which remain unremarkable.

 

Aorta: Vascular calcification is within the aorta. 

 

Inferior vena cava: Normal.

 

CT PELVIS: 

Loops of bowel within the abdomen and pelvis are normal.     There are loops of bowel which are incom
pletely distended or lack oral contrast limiting their evaluation.

 

Appendix: Normal as visualized.

 

Urinary bladder: Normal. 

 

Genitourinary structures: Prostate is normal

 

Osseous structures: No suspicious lytic or sclerotic lesions.

 

IMPRESSIONS:

1.  Normal CT abdomen and pelvis

## 2021-07-01 LAB
CHOLEST SERPL-MCNC: 115 MG/DL (ref 0–200)
HDLC SERPL-MCNC: 37 MG/DL (ref 40–60)
LDLC SERPL CALC-MCNC: 64.2 MG/DL (ref 0–131)
TRIGL SERPL-MCNC: 69 MG/DL (ref 0–149)
VLDLC SERPL CALC-MCNC: 13.8 MG/DL (ref 5–40)

## 2021-12-20 ENCOUNTER — HOSPITAL ENCOUNTER (EMERGENCY)
Dept: HOSPITAL 47 - EC | Age: 21
Discharge: LEFT BEFORE DISCHARGE | End: 2021-12-20
Payer: COMMERCIAL

## 2021-12-20 VITALS
RESPIRATION RATE: 18 BRPM | SYSTOLIC BLOOD PRESSURE: 143 MMHG | DIASTOLIC BLOOD PRESSURE: 86 MMHG | HEART RATE: 89 BPM | TEMPERATURE: 98.2 F

## 2021-12-20 DIAGNOSIS — R10.32: Primary | ICD-10-CM

## 2021-12-20 DIAGNOSIS — Z87.891: ICD-10-CM

## 2021-12-20 DIAGNOSIS — R11.2: ICD-10-CM

## 2021-12-20 DIAGNOSIS — Z91.011: ICD-10-CM

## 2021-12-20 LAB
ALBUMIN SERPL-MCNC: 5.1 G/DL (ref 3.5–5)
ALP SERPL-CCNC: 99 U/L (ref 38–126)
ALT SERPL-CCNC: 74 U/L (ref 4–49)
AMYLASE SERPL-CCNC: 37 U/L (ref 30–110)
ANION GAP SERPL CALC-SCNC: 16 MMOL/L
AST SERPL-CCNC: 38 U/L (ref 17–59)
BASOPHILS # BLD AUTO: 0 K/UL (ref 0–0.2)
BASOPHILS NFR BLD AUTO: 0 %
BUN SERPL-SCNC: 21 MG/DL (ref 9–20)
CALCIUM SPEC-MCNC: 9.7 MG/DL (ref 8.4–10.2)
CHLORIDE SERPL-SCNC: 98 MMOL/L (ref 98–107)
CO2 SERPL-SCNC: 22 MMOL/L (ref 22–30)
EOSINOPHIL # BLD AUTO: 0.1 K/UL (ref 0–0.7)
EOSINOPHIL NFR BLD AUTO: 1 %
ERYTHROCYTE [DISTWIDTH] IN BLOOD BY AUTOMATED COUNT: 6.22 M/UL (ref 4.3–5.9)
ERYTHROCYTE [DISTWIDTH] IN BLOOD: 12.7 % (ref 11.5–15.5)
GLUCOSE SERPL-MCNC: 102 MG/DL (ref 74–99)
HCT VFR BLD AUTO: 53.2 % (ref 39–53)
HGB BLD-MCNC: 18.1 GM/DL (ref 13–17.5)
LIPASE SERPL-CCNC: 53 U/L (ref 23–300)
LYMPHOCYTES # SPEC AUTO: 1.3 K/UL (ref 1–4.8)
LYMPHOCYTES NFR SPEC AUTO: 11 %
MCH RBC QN AUTO: 29.2 PG (ref 25–35)
MCHC RBC AUTO-ENTMCNC: 34.1 G/DL (ref 31–37)
MCV RBC AUTO: 85.6 FL (ref 80–100)
MONOCYTES # BLD AUTO: 0.7 K/UL (ref 0–1)
MONOCYTES NFR BLD AUTO: 6 %
NEUTROPHILS # BLD AUTO: 9.8 K/UL (ref 1.3–7.7)
NEUTROPHILS NFR BLD AUTO: 81 %
PLATELET # BLD AUTO: 279 K/UL (ref 150–450)
POTASSIUM SERPL-SCNC: 3.7 MMOL/L (ref 3.5–5.1)
PROT SERPL-MCNC: 8 G/DL (ref 6.3–8.2)
SODIUM SERPL-SCNC: 136 MMOL/L (ref 137–145)
WBC # BLD AUTO: 12.1 K/UL (ref 4–11)

## 2021-12-20 PROCEDURE — 85025 COMPLETE CBC W/AUTO DIFF WBC: CPT

## 2021-12-20 PROCEDURE — 83605 ASSAY OF LACTIC ACID: CPT

## 2021-12-20 PROCEDURE — 74018 RADEX ABDOMEN 1 VIEW: CPT

## 2021-12-20 PROCEDURE — 82150 ASSAY OF AMYLASE: CPT

## 2021-12-20 PROCEDURE — 36415 COLL VENOUS BLD VENIPUNCTURE: CPT

## 2021-12-20 PROCEDURE — 96374 THER/PROPH/DIAG INJ IV PUSH: CPT

## 2021-12-20 PROCEDURE — 83690 ASSAY OF LIPASE: CPT

## 2021-12-20 PROCEDURE — 80053 COMPREHEN METABOLIC PANEL: CPT

## 2021-12-20 PROCEDURE — 99284 EMERGENCY DEPT VISIT MOD MDM: CPT

## 2021-12-20 NOTE — XR
EXAMINATION TYPE: XR KUB

 

DATE OF EXAM: 12/20/2021 12:32 PM

 

CLINICAL HISTORY:  Abdominal pain.

 

TECHNIQUE:  Two Upright KUB images of the abdomen are obtained.

 

COMPARISON: CT abdomen and pelvis June 30, 2021.

 

FINDINGS: Scattered gas is seen in non-distended small bowel loops. Gas and fecal material is seen in
 non-distended colon and rectum. There is no visceromegaly, pneumoperitoneum, or abnormal calcificati
on appreciated. The lung bases are clear and the osseous structures are intact.

 

IMPRESSION: 

 

Overall nonobstructive bowel gas pattern redemonstrated.

## 2021-12-20 NOTE — ED
General Adult HPI





- General


Chief complaint: Abdominal Pain


Stated complaint: abd pain


Time Seen by Provider: 12/20/21 11:15


Source: patient, family, RN notes reviewed, old records reviewed


Mode of arrival: ambulatory


Limitations: no limitations





- History of Present Illness


Initial comments: 





21-year-old well-appearing male patient presents to the emergency room with 2 

years of abdominal pain that is intermittent.  Patient states that he has been 

seen for this multiple times in the past.  He had a CAT scan in June of this 

year for similar pain.  He is scheduled to see Dr. Purcell however the pain has 

returned in the left lower quadrant today and is 10 out of 10.  He states that 

he has had nausea and vomiting for a week, no fevers. 


-: year(s) (2)


Location: abdomen (llq)


Severity scale (1-10): 10


Quality: burning


Consistency: intermittent


Improves with: none


Associated Symptoms: nausea/vomiting





- Related Data


                                Home Medications











 Medication  Instructions  Recorded  Confirmed


 


Famotidine [Pepcid] 20 mg PO DAILY 12/20/21 12/20/21


 


Ondansetron [Zofran] 4 mg PO Q6H PRN 12/20/21 12/20/21











                                    Allergies











Allergy/AdvReac Type Severity Reaction Status Date / Time


 


lactose AdvReac  Nausea & Verified 12/20/21 11:38





   Vomiting &  





   Diarrhea  














Review of Systems


ROS Statement: 


Those systems with pertinent positive or pertinent negative responses have been 

documented in the HPI.





ROS Other: All systems not noted in ROS Statement are negative.





Past Medical History


Past Medical History: No Reported History


Additional Past Medical History / Comment(s): pilonidal cyst


History of Any Multi-Drug Resistant Organisms: None Reported


Past Surgical History: No Surgical Hx Reported


Additional Past Surgical History / Comment(s): EGD


Past Anesthesia/Blood Transfusion Reactions: No Reported Reaction


Past Psychological History: No Psychological Hx Reported


Smoking Status: Former smoker, Vaper


Past Alcohol Use History: None Reported, Occasional


Past Drug Use History: Marijuana





- Past Family History


  ** Mother


Family Medical History: No Reported History





  ** Father


Family Medical History: No Reported History





  ** Brother(s)


Family Medical History: Asthma





General Exam


Limitations: no limitations


General appearance: alert, in no apparent distress


Head exam: Present: atraumatic, normocephalic, normal inspection


Eye exam: Present: normal appearance, EOMI.  Absent: scleral icterus, 

conjunctival injection, periorbital swelling, periorbital tenderness


ENT exam: Present: normal exam, normal oropharynx, mucous membranes moist


Neck exam: Present: normal inspection, full ROM.  Absent: tenderness, 

meningismus, lymphadenopathy, thyromegaly


Respiratory exam: Present: normal lung sounds bilaterally.  Absent: respiratory 

distress, wheezes, rales, rhonchi, stridor, chest wall tenderness, accessory 

muscle use, decreased breath sounds


Cardiovascular Exam: Present: regular rate, normal rhythm, normal heart sounds. 

 Absent: systolic murmur, diastolic murmur, rubs, gallop, clicks, JVD


GI/Abdominal exam: Present: soft, tenderness (epigastric), normal bowel sounds. 

 Absent: distended, guarding, rebound, rigid


Extremities exam: Present: normal inspection, full ROM, normal capillary refill.

  Absent: tenderness, pedal edema, joint swelling, calf tenderness


Back exam: Present: normal inspection, full ROM.  Absent: tenderness, rash noted


Neurological exam: Present: alert, oriented X3


Psychiatric exam: Present: normal affect, normal mood


Skin exam: Present: warm, dry, intact, normal color, other (acne forehead).  

Absent: rash, cyanosis, diaphoretic, petechiae, pallor





Course


                                   Vital Signs











  12/20/21





  10:55


 


Temperature 98.2 F


 


Pulse Rate 89


 


Respiratory 18





Rate 


 


Blood Pressure 143/86


 


O2 Sat by Pulse 98





Oximetry 














Medical Decision Making





- Medical Decision Making


Well-appearing 21-year-old male, alert and oriented 4, presents to emergency 

room with abdominal pain.  This pain has been ongoing for the past 2 years. 

Patient was seen June 23 and 30th for similar abdominal pain and had CAT scans 

done both times.  Today labs show WBC count is 12.1 which is likely from 

persistent vomiting.  Hemoglobin and hematocrit are stable.  Patient's anion gap

 is 16, BUN is 21 and he was given a liter of normal saline.  X-ray of the 

abdomen shows overall nonobstructive bowel gas pattern with no evidence of 

visceromegaly or pneumoperitoneum. Patient was also given Pepcid and a GI 

cocktail for the epigastric burning.


Patient does have an appointment coming up with Dr. Purcell gastroenterology.  


While awaiting urinalysis, Patient left AGAINST MEDICAL ADVICE per the nurse.  

Case discussed with Dr. Birch.





- Lab Data


Result diagrams: 


                                 12/20/21 12:09





                                 12/20/21 12:09


                                   Lab Results











  12/20/21 12/20/21 12/20/21 Range/Units





  12:09 12:09 12:09 


 


WBC  12.1 H    (4.0-11.0)  k/uL


 


RBC  6.22 H    (4.30-5.90)  m/uL


 


Hgb  18.1 H    (13.0-17.5)  gm/dL


 


Hct  53.2 H    (39.0-53.0)  %


 


MCV  85.6    (80.0-100.0)  fL


 


MCH  29.2    (25.0-35.0)  pg


 


MCHC  34.1    (31.0-37.0)  g/dL


 


RDW  12.7    (11.5-15.5)  %


 


Plt Count  279    (150-450)  k/uL


 


MPV  7.8    


 


Neutrophils %  81    %


 


Lymphocytes %  11    %


 


Monocytes %  6    %


 


Eosinophils %  1    %


 


Basophils %  0    %


 


Neutrophils #  9.8 H    (1.3-7.7)  k/uL


 


Lymphocytes #  1.3    (1.0-4.8)  k/uL


 


Monocytes #  0.7    (0-1.0)  k/uL


 


Eosinophils #  0.1    (0-0.7)  k/uL


 


Basophils #  0.0    (0-0.2)  k/uL


 


Sodium   136 L   (137-145)  mmol/L


 


Potassium   3.7   (3.5-5.1)  mmol/L


 


Chloride   98   ()  mmol/L


 


Carbon Dioxide   22   (22-30)  mmol/L


 


Anion Gap   16   mmol/L


 


BUN   21 H   (9-20)  mg/dL


 


Creatinine   1.06   (0.66-1.25)  mg/dL


 


Est GFR (CKD-EPI)AfAm   >90   (>60 ml/min/1.73 sqM)  


 


Est GFR (CKD-EPI)NonAf   >90   (>60 ml/min/1.73 sqM)  


 


Glucose   102 H   (74-99)  mg/dL


 


Plasma Lactic Acid Bladimir    1.4  (0.7-2.0)  mmol/L


 


Calcium   9.7   (8.4-10.2)  mg/dL


 


Total Bilirubin   1.7 H   (0.2-1.3)  mg/dL


 


AST   38   (17-59)  U/L


 


ALT   74 H   (4-49)  U/L


 


Alkaline Phosphatase   99   ()  U/L


 


Total Protein   8.0   (6.3-8.2)  g/dL


 


Albumin   5.1 H   (3.5-5.0)  g/dL


 


Amylase   37   ()  U/L


 


Lipase   53   ()  U/L














Disposition


Clinical Impression: 


 Abdominal pain





Disposition: Left Against Medical Advice


Referrals: 


Berta Tipton MD [Primary Care Provider] - 1-2 days


Time of Disposition: 14:14

## 2023-07-01 NOTE — ED
Nausea/Vomiting/Diarrhea HPI





- General


Chief complaint: Nausea/Vomiting/Diarrhea


Stated complaint: Vomiting


Time Seen by Provider: 04/30/20 14:25


Source: patient


Mode of arrival: ambulatory


Limitations: no limitations





- History of Present Illness


Initial comments: 





Patient is a 19-year-old male presenting to the emergency Department with 

complaints of nausea, vomiting 4 days.  Patient states he ate [employer] when 

his symptoms started and then 2 hours later started having nausea and vomiting. 

He denies having diarrhea.  He states he has not had a bowel movement since his 

symptoms began.  He states he has been urinating less.  He states when he tries 

to drink fluids he starts having more vomiting.  They went to PCP today who 

recommended going to the ER.  He denies having any chest pain, short of breath, 

fever, chills.  He admits to some mild epigastric pain, no lower quadrant pain. 

He denies history of abdominal surgeries.  He has no other complaints at this 

time.  Upon arrival to the ER, his vital signs are stable.





- Related Data


                                Home Medications











 Medication  Instructions  Recorded  Confirmed


 


Methylphenidate HCl [Concerta] 27 mg PO DAILY 09/11/15 12/01/15








                                  Previous Rx's











 Medication  Instructions  Recorded


 


Famotidine [Pepcid] 20 mg PO BID #14 tablet 12/01/15











                                    Allergies











Allergy/AdvReac Type Severity Reaction Status Date / Time


 


No Known Allergies Allergy   Verified 04/30/20 14:06














Review of Systems


ROS Statement: 


Those systems with pertinent positive or pertinent negative responses have been 

documented in the HPI.





ROS Other: All systems not noted in ROS Statement are negative.





Past Medical History


Past Medical History: No Reported History


History of Any Multi-Drug Resistant Organisms: None Reported


Past Surgical History: No Surgical Hx Reported


Past Psychological History: ADD/ADHD


Smoking Status: Current every day smoker


Past Alcohol Use History: None Reported


Past Drug Use History: None Reported





General Exam





- General Exam Comments


Initial Comments: 





GENERAL: 


Well-appearing, well-nourished and in no acute distress.





HEAD: 


Atraumatic, normocephalic.





EYES:


Pupils equal round and reactive to light, extraocular movements intact, sclera 

anicteric, conjunctiva are normal.





ENT: 


TMs normal, nares patent, oropharynx clear without exudates.  Dry mucous 

membranes.





NECK: 


Normal range of motion, supple without lymphadenopathy or JVD.





LUNGS:


 Breath sounds clear to auscultation bilaterally and equal.  No wheezes rales or

 rhonchi.





HEART:


Regular rate and rhythm without murmurs, rubs or gallops.





ABDOMEN: 


Very mild epigastric tenderness.  Soft, normoactive bowel sounds.  No guarding, 

no rebound.  No masses appreciated.





: Deferred 





EXTREMITIES: 


Normal range of motion, no pitting or edema.  No clubbing or cyanosis.





NEUROLOGICAL: 


Cranial nerves II through XII grossly intact.  Normal speech, normal gait.





PSYCH:


Normal mood, normal affect.





SKIN:


 Warm, Dry, normal turgor, no rashes or lesions noted.


Limitations: no limitations





Course


                                   Vital Signs











  04/30/20 04/30/20 04/30/20





  14:02 14:06 15:06


 


Temperature 98.3 F  


 


Pulse Rate 75  75


 


Respiratory 18 20 20





Rate   


 


Blood Pressure 124/89  144/84


 


O2 Sat by Pulse 97  100





Oximetry   














  04/30/20 04/30/20





  16:00 16:36


 


Temperature  


 


Pulse Rate 75 75


 


Respiratory 20 20





Rate  


 


Blood Pressure 138/90 142/80


 


O2 Sat by Pulse 100 100





Oximetry  














Medical Decision Making





- Medical Decision Making





Patient is an 19-year-old male here for nausea and vomiting 4 days.  Vital 

signs are stable, exam is unremarkable except for some mild epigastric 

tenderness.  Patient's lab work reveals signs of dehydration, no acute findings.

  KUB shows an overall nonobstructive bowel gas pattern.  Patient was given 2 L 

of fluids and Zofran reports improvement of symptoms.  I discussed with patient 

and his symptoms are most likely related to dehydration.  Patient will be sent 

home with trial pack of Zofran for additional symptoms.  Patient is in agreement

 with this plan of care.  He is stable for discharge.  Return parameters were 

discussed with the patient and he verbalized understanding.





- Lab Data


Result diagrams: 


                                 04/30/20 14:20





                                 04/30/20 14:20


                                   Lab Results











  04/30/20 04/30/20 04/30/20 Range/Units





  14:20 14:20 14:20 


 


WBC  12.1 H    (4.0-11.0)  k/uL


 


RBC  6.14 H    (4.30-5.90)  m/uL


 


Hgb  17.9 H    (13.0-17.5)  gm/dL


 


Hct  52.0    (39.0-53.0)  %


 


MCV  84.7    (80.0-100.0)  fL


 


MCH  29.2    (25.0-35.0)  pg


 


MCHC  34.4    (31.0-37.0)  g/dL


 


RDW  12.8    (11.5-15.5)  %


 


Plt Count  336    (150-450)  k/uL


 


Neutrophils %  78    %


 


Lymphocytes %  12    %


 


Monocytes %  8    %


 


Eosinophils %  0    %


 


Basophils %  1    %


 


Neutrophils #  9.4 H    (1.3-7.7)  k/uL


 


Lymphocytes #  1.5    (1.0-4.8)  k/uL


 


Monocytes #  0.9    (0-1.0)  k/uL


 


Eosinophils #  0.0    (0-0.7)  k/uL


 


Basophils #  0.1    (0-0.2)  k/uL


 


Sodium   138   (137-145)  mmol/L


 


Potassium   3.6   (3.5-5.1)  mmol/L


 


Chloride   95 L   ()  mmol/L


 


Carbon Dioxide   21 L   (22-30)  mmol/L


 


Anion Gap   22   mmol/L


 


BUN   31 H   (8-21)  mg/dL


 


Creatinine   1.10   (0.66-1.25)  mg/dL


 


Est GFR (CKD-EPI)AfAm   >90   (>60 ml/min/1.73 sqM)  


 


Est GFR (CKD-EPI)NonAf   >90   (>60 ml/min/1.73 sqM)  


 


Glucose   97   (74-99)  mg/dL


 


Plasma Lactic Acid Bladimir    1.3  (0.7-2.0)  mmol/L


 


Calcium   11.1 H   (8.4-10.3)  mg/dL


 


Total Bilirubin   2.4 H   (0.2-1.3)  mg/dL


 


AST   26   (17-59)  U/L


 


ALT   21   (4-49)  U/L


 


Alkaline Phosphatase   115   ()  U/L


 


Total Protein   9.7 H   (6.3-8.2)  g/dL


 


Albumin   6.2 H   (3.5-5.0)  g/dL














Disposition


Clinical Impression: 


 Dehydration, Nausea & vomiting





Disposition: HOME SELF-CARE


Condition: Stable


Instructions (If sedation given, give patient instructions):  Dehydration (ED)


Additional Instructions: 


Please return to the Emergency Department if symptoms worsen or any other 

concerns.


Continue to increase fluid intake.  May use Zofran for additional nausea.  


May use MiraLAX for constipation.


Follow up with PCP.


Is patient prescribed a controlled substance at d/c from ED?: No


Referrals: 


Berta Tipton MD [Primary Care Provider] - 1-2 days 90